# Patient Record
Sex: MALE | Race: WHITE | Employment: OTHER | URBAN - METROPOLITAN AREA
[De-identification: names, ages, dates, MRNs, and addresses within clinical notes are randomized per-mention and may not be internally consistent; named-entity substitution may affect disease eponyms.]

---

## 2017-02-20 ENCOUNTER — OFFICE VISIT (OUTPATIENT)
Dept: BARIATRICS/WEIGHT MGMT | Age: 62
End: 2017-02-20

## 2017-02-20 VITALS
RESPIRATION RATE: 16 BRPM | SYSTOLIC BLOOD PRESSURE: 120 MMHG | HEIGHT: 68 IN | WEIGHT: 293 LBS | HEART RATE: 92 BPM | DIASTOLIC BLOOD PRESSURE: 88 MMHG | BODY MASS INDEX: 44.41 KG/M2

## 2017-02-20 DIAGNOSIS — Z98.84 S/P LAPAROSCOPIC SLEEVE GASTRECTOMY: Primary | ICD-10-CM

## 2017-02-20 DIAGNOSIS — E66.01 MORBID OBESITY WITH BMI OF 40.0-44.9, ADULT (HCC): ICD-10-CM

## 2017-02-20 PROCEDURE — 99213 OFFICE O/P EST LOW 20 MIN: CPT | Performed by: NURSE PRACTITIONER

## 2017-02-20 ASSESSMENT — ENCOUNTER SYMPTOMS
GASTROINTESTINAL NEGATIVE: 1
EYES NEGATIVE: 1
RESPIRATORY NEGATIVE: 1
ALLERGIC/IMMUNOLOGIC NEGATIVE: 1

## 2017-05-25 ENCOUNTER — OFFICE VISIT (OUTPATIENT)
Dept: BARIATRICS/WEIGHT MGMT | Age: 62
End: 2017-05-25

## 2017-05-25 VITALS
DIASTOLIC BLOOD PRESSURE: 82 MMHG | WEIGHT: 285.8 LBS | SYSTOLIC BLOOD PRESSURE: 129 MMHG | RESPIRATION RATE: 16 BRPM | HEIGHT: 68 IN | HEART RATE: 76 BPM | BODY MASS INDEX: 43.32 KG/M2

## 2017-05-25 DIAGNOSIS — Z98.84 S/P LAPAROSCOPIC SLEEVE GASTRECTOMY: Primary | ICD-10-CM

## 2017-05-25 DIAGNOSIS — E66.01 MORBID OBESITY WITH BMI OF 40.0-44.9, ADULT (HCC): ICD-10-CM

## 2017-05-25 PROCEDURE — 99213 OFFICE O/P EST LOW 20 MIN: CPT | Performed by: SURGERY

## 2017-11-20 ENCOUNTER — TELEPHONE (OUTPATIENT)
Dept: BARIATRICS/WEIGHT MGMT | Age: 62
End: 2017-11-20

## 2017-11-29 ENCOUNTER — OFFICE VISIT (OUTPATIENT)
Dept: BARIATRICS/WEIGHT MGMT | Age: 62
End: 2017-11-29

## 2017-11-29 VITALS
HEIGHT: 68 IN | WEIGHT: 275 LBS | SYSTOLIC BLOOD PRESSURE: 130 MMHG | RESPIRATION RATE: 16 BRPM | HEART RATE: 92 BPM | DIASTOLIC BLOOD PRESSURE: 88 MMHG | BODY MASS INDEX: 41.68 KG/M2

## 2017-11-29 DIAGNOSIS — Z98.84 S/P LAPAROSCOPIC SLEEVE GASTRECTOMY: Primary | ICD-10-CM

## 2017-11-29 DIAGNOSIS — E66.01 MORBID OBESITY WITH BMI OF 40.0-44.9, ADULT (HCC): ICD-10-CM

## 2017-11-29 PROCEDURE — 99213 OFFICE O/P EST LOW 20 MIN: CPT | Performed by: NURSE PRACTITIONER

## 2017-11-29 ASSESSMENT — ENCOUNTER SYMPTOMS
EYES NEGATIVE: 1
ALLERGIC/IMMUNOLOGIC NEGATIVE: 1
RESPIRATORY NEGATIVE: 1
GASTROINTESTINAL NEGATIVE: 1

## 2018-05-23 ENCOUNTER — OFFICE VISIT (OUTPATIENT)
Dept: BARIATRICS/WEIGHT MGMT | Age: 63
End: 2018-05-23

## 2018-05-23 VITALS
HEART RATE: 106 BPM | RESPIRATION RATE: 16 BRPM | BODY MASS INDEX: 40.77 KG/M2 | SYSTOLIC BLOOD PRESSURE: 126 MMHG | WEIGHT: 269 LBS | DIASTOLIC BLOOD PRESSURE: 88 MMHG | HEIGHT: 68 IN

## 2018-05-23 DIAGNOSIS — E66.01 MORBID OBESITY WITH BMI OF 40.0-44.9, ADULT (HCC): ICD-10-CM

## 2018-05-23 DIAGNOSIS — Z98.84 S/P LAPAROSCOPIC SLEEVE GASTRECTOMY: Primary | ICD-10-CM

## 2018-05-23 PROCEDURE — 99213 OFFICE O/P EST LOW 20 MIN: CPT | Performed by: NURSE PRACTITIONER

## 2018-05-23 ASSESSMENT — ENCOUNTER SYMPTOMS
EYES NEGATIVE: 1
ALLERGIC/IMMUNOLOGIC NEGATIVE: 1
GASTROINTESTINAL NEGATIVE: 1
RESPIRATORY NEGATIVE: 1

## 2019-07-19 NOTE — PROGRESS NOTES
Dietary Assessment Note    Vitals:   Vitals:    11/29/17 1001   BP: 130/88   Pulse: 92   Resp: 16   Weight: 275 lb (124.7 kg)   Height: 5' 7.75\" (1.721 m)    Patient lost 10.8 lbs over 6 months. Pt reports eating 7325-9064 calories and looks for protein first when reading labels. Feels weight loss is slowing down but is also still noticing that he is loosing inches. Total Weight Loss: 96.3 lbs    Labs reviewed: no lab studies available for review at time of visit    Protein intake: Patient not tracking     Fluid intake: >64 oz/day    Multivitamin/mineral intake: Centrum - how many/day: 2    Calcium intake: 1 Citracal/day     Other: potassium    Exercise: Yes. How much: [employer] 3 days/week.  What kind: weights + 2-3 miles stationary bike 1-2 days at home    Nutrition Assessment: 1 year 6 months post-op visit. eats a balanced diet  Breakfast: 2 sausage patties + small biscuit + coffee    Snack: PB crackers    Lunch: Leftovers     Snack: Protein bar    Dinner: Pork roast + mashed potatoes + green beans OR chicken stir aquino w/ broccoli/cauliflower    Snack: Sometimes PB crackers OR protein bar    Following 30/30/30 rule: Eating in ~ 15 minutes - just sips w/ meals    Amount per meal: 6-8 oz    Food Intolerances/issues: banana    Client Concerns: None    Goals: 200  Lbs  - Continue current exercise routine + increase intensity as able  - Continue current eating patterns including protein with all meals and snacks  - Take 30 minutes to eat a meal - set timers, use smaller utensils   - Choose foods with less than 10 grams of sugar and less than 5 grams of fat    Plan: Follow up at 2 years post op and as needed      [employer]
and protein. Needs to continue to work on the slowing of his eating. He is exercising at [employer] three days per week and the other days riding his stationary bike at home. Encouraged continued physical activity. He is taking vitamins as instructed. He did meet with the registered dietitian for continued follow up. I agree with recommendations and plan. Reviewed recent labs. PCP has already adjusted synthroid. We will see him back in 6 months for continued follow up. A total of 15 minutes was spent face-to-face with the patient and over half of that time was spent counseling the patient on proper dietary behaviors, exercise and post-op progress.
none

## 2019-11-07 ENCOUNTER — TELEPHONE (OUTPATIENT)
Dept: BARIATRICS/WEIGHT MGMT | Age: 64
End: 2019-11-07

## 2020-09-18 ENCOUNTER — TELEPHONE (OUTPATIENT)
Dept: BARIATRICS/WEIGHT MGMT | Age: 65
End: 2020-09-18